# Patient Record
Sex: MALE | Race: WHITE | NOT HISPANIC OR LATINO | Employment: FULL TIME | URBAN - NONMETROPOLITAN AREA
[De-identification: names, ages, dates, MRNs, and addresses within clinical notes are randomized per-mention and may not be internally consistent; named-entity substitution may affect disease eponyms.]

---

## 2017-01-16 ENCOUNTER — TRANSCRIBE ORDERS (OUTPATIENT)
Dept: PODIATRY | Facility: CLINIC | Age: 42
End: 2017-01-16

## 2017-01-16 DIAGNOSIS — L60.0 INGROWN TOENAIL: Primary | ICD-10-CM

## 2017-01-17 ENCOUNTER — OFFICE VISIT (OUTPATIENT)
Dept: PODIATRY | Facility: CLINIC | Age: 42
End: 2017-01-17

## 2017-01-17 VITALS
SYSTOLIC BLOOD PRESSURE: 147 MMHG | DIASTOLIC BLOOD PRESSURE: 79 MMHG | HEART RATE: 96 BPM | BODY MASS INDEX: 36.45 KG/M2 | HEIGHT: 78 IN | WEIGHT: 315 LBS | OXYGEN SATURATION: 96 %

## 2017-01-17 DIAGNOSIS — L60.0 INGROWN TOENAIL: ICD-10-CM

## 2017-01-17 DIAGNOSIS — L03.031 CELLULITIS OF GREAT TOE, RIGHT: Primary | ICD-10-CM

## 2017-01-17 PROCEDURE — 11750 EXCISION NAIL&NAIL MATRIX: CPT | Performed by: PODIATRIST

## 2017-01-17 PROCEDURE — 99203 OFFICE O/P NEW LOW 30 MIN: CPT | Performed by: PODIATRIST

## 2017-01-17 RX ORDER — SULFAMETHOXAZOLE AND TRIMETHOPRIM 800; 160 MG/1; MG/1
TABLET ORAL
Refills: 0 | COMMUNITY
Start: 2017-01-11 | End: 2020-03-09

## 2017-01-17 RX ORDER — SPIRONOLACTONE 50 MG/1
TABLET, FILM COATED ORAL
Refills: 2 | COMMUNITY
Start: 2017-01-11

## 2017-01-17 RX ORDER — VALSARTAN AND HYDROCHLOROTHIAZIDE 320; 25 MG/1; MG/1
TABLET, FILM COATED ORAL
Refills: 2 | COMMUNITY
Start: 2017-01-11 | End: 2020-03-09

## 2017-01-17 RX ORDER — HYDROCODONE BITARTRATE AND ACETAMINOPHEN 10; 325 MG/1; MG/1
TABLET ORAL
Refills: 0 | COMMUNITY
Start: 2017-01-11

## 2017-01-17 RX ORDER — CARISOPRODOL 350 MG/1
TABLET ORAL
Refills: 0 | COMMUNITY
Start: 2017-01-11

## 2017-01-17 RX ORDER — AMLODIPINE BESYLATE 10 MG/1
TABLET ORAL
Refills: 3 | COMMUNITY
Start: 2017-01-11 | End: 2020-03-09

## 2017-01-17 NOTE — PROGRESS NOTES
"Wilner Ortiz  1975  41 y.o. male   Patient presents with right big toe pain.    01/17/2017  Chief Complaint   Patient presents with   • Right Foot - Pain           History of Present Illness    Patient presents to clinic today with chief complaint of right great toe infection.  He is currently taking antibiotics prescribed to him by his primary care doctor.  Him and his primary care doctor both tried to do ingrown toenail procedures on this toe.  He says it is currently very swollen and painful.  Rates the pain as 8 out of 10.  Describes it as sharp and constant.  He denies any trauma to the toe.  He has no other pedal complaints.         No past medical history on file.      No past surgical history on file.      No family history on file.      Social History     Social History   • Marital status:      Spouse name: N/A   • Number of children: N/A   • Years of education: N/A     Occupational History   • Not on file.     Social History Main Topics   • Smoking status: Not on file   • Smokeless tobacco: Not on file   • Alcohol use Not on file   • Drug use: Not on file   • Sexual activity: Not on file     Other Topics Concern   • Not on file     Social History Narrative         Current Outpatient Prescriptions   Medication Sig Dispense Refill   • amLODIPine (NORVASC) 10 MG tablet   3   • carisoprodol (SOMA) 350 MG tablet   0   • HYDROcodone-acetaminophen (NORCO)  MG per tablet   0   • spironolactone (ALDACTONE) 50 MG tablet   2   • sulfamethoxazole-trimethoprim (BACTRIM DS,SEPTRA DS) 800-160 MG per tablet   0   • valsartan-hydrochlorothiazide (DIOVAN-HCT) 320-25 MG per tablet   2     No current facility-administered medications for this visit.          OBJECTIVE    Visit Vitals   • /79   • Pulse 96   • Ht 78\" (198.1 cm)   • Wt (!) 428 lb (194 kg)   • SpO2 96%   • BMI 49.46 kg/m2         Review of Systems   Constitutional: Negative for chills and fever.   Cardiovascular: Negative for chest pain. "   Gastrointestinal: Negative for constipation, diarrhea, nausea and vomiting.   Skin: Negative for wound. cellulitis and edema right hallux  Musculoskeletal: right foot pain      Constitutional: well developed, well nourished    HEENT: Normocephalic and atraumatic, normal hearing    Respiratory: Non labored respirations noted    Cardiovascular:    DP/PT pulses palpable    CFT brisk  to all digits  Skin temp is warm to warm from proximal tibia to distal digits  Pedal hair growth present.   Erythema and edema noted to the right hallux     Musculoskeletal:  Muscle strength is 5/5 for all muscle groups tested   ROM of the 1st MTP is full without pain or crepitus  ROM of the MTJ is full without pain or crepitus    ROM of the STJ is full without pain or crepitus    ROM of the ankle joint is full without pain or crepitus    Significant pain on palpation to the right hallux lateral border and nail plate  Rectus foot type     Dermatological:   Nails 2-5 are within normal limits for length and thickness.  Right hallux nail is ingrowing and infected on the lateral border    Webspaces 1-4 bilateral are clean, dry and intact.   No subcutaneous nodules or masses noted        Neurological:   Protective sensation intact    Sensation intact to light touch    DTR intact    Psychiatric: A&O x 3 with normal mood and affect. NAD.         Nail Removal  Date/Time: 1/18/2017 9:15 AM  Performed by: SCOTT BRANDON  Authorized by: SCOTT BRANDON   Consent: Verbal consent obtained. Written consent obtained.  Risks and benefits: risks, benefits and alternatives were discussed  Consent given by: patient  Patient understanding: patient states understanding of the procedure being performed  Patient identity confirmed: verbally with patient  Nail removal extremity: right hallux.  Anesthesia: digital block    Anesthesia:  Anesthesia: digital block  Local Anesthetic: lidocaine 2% without epinephrine   Sedation:  Patient sedated: no    Preparation:  skin prepped with Betadine  Amount removed: complete  Nail matrix removed: complete  Dressing: antibiotic ointment and dressing applied  Patient tolerance: Patient tolerated the procedure well with no immediate complications              ASSESSMENT AND PLAN    Wilner was seen today for pain.    Diagnoses and all orders for this visit:    Cellulitis of great toe, right    Ingrown toenail    Phenol nail avulsion right hallux nail  Aftercare instruction sheet dispensed to patient  Finish course of antibiotics  All his questions were answered to his satisfaction.  Return to clinic in 2 weeks or sooner if needed            This document has been electronically signed by Dennis Cervantes DPM on January 18, 2017 9:13 AM     1/18/2017  9:13 AM

## 2017-01-17 NOTE — MR AVS SNAPSHOT
"                        Wilner Ortiz   1/17/2017 3:00 PM   Office Visit    Dept Phone:  [phone]   Encounter #:  [id number]    Provider:  Dennis Cervantes DPM   Department:  [facility] P[facility]                Your Full Care Plan              Your Updated Medication List          This list is accurate as of: 1/17/17  3:36 PM.  Always use your most recent med list.                amLODIPine 10 MG tablet   Commonly known as:  NORVASC       carisoprodol 350 MG tablet   Commonly known as:  SOMA       HYDROcodone-acetaminophen  MG per tablet   Commonly known as:  NORCO       spironolactone 50 MG tablet   Commonly known as:  ALDACTONE       sulfamethoxazole-trimethoprim 800-160 MG per tablet   Commonly known as:  BACTRIM DS,SEPTRA DS       valsartan-hydrochlorothiazide 320-25 MG per tablet   Commonly known as:  DIOVAN-HCT               Instructions     None    Patient Instructions History      Upcoming Appointments     Visit Type Date Time Department    NEW PATIENT 1/17/2017  3:00 PM [facility]    FOLLOW UP 1/31/2017  3:30 PM [facility]      MyChart Signup     Our records indicate that you have declined [facility] sign[facility]. If you would like to sign up for [facility], please email [email or web address] or call [phone] to obtain an activation code.             Other Info from Your Visit           Your Appointments     Jan 31, 2017  3:30 PM CST   Follow Up with Dennis Cervantes DPM   [facility] PODIAT[facility] (--)    [address]  [facility] [place] [address] [place] [id number] [phone]           Arrive 15 minutes prior to appointment.              Allergies     Lisinopril        Reason for Visit     Right Foot - Pain           Vital Signs     Blood Pressure Pulse Height Weight Oxygen Saturation Body Mass Index    147/79 96 78\" (198.1 cm) 428 lb (194 kg) 96% 49.46 kg/m2        "

## 2017-01-31 ENCOUNTER — OFFICE VISIT (OUTPATIENT)
Dept: PODIATRY | Facility: CLINIC | Age: 42
End: 2017-01-31

## 2017-01-31 VITALS — WEIGHT: 315 LBS | HEIGHT: 78 IN | BODY MASS INDEX: 36.45 KG/M2

## 2017-01-31 DIAGNOSIS — L60.0 INGROWN TOENAIL: Primary | ICD-10-CM

## 2017-01-31 PROCEDURE — 99212 OFFICE O/P EST SF 10 MIN: CPT | Performed by: PODIATRIST

## 2017-01-31 NOTE — PROGRESS NOTES
"Wilner Ortiz  1975  41 y.o. male   Patient presents for follow-up on right hallux toenail removal.    01/31/17    Chief Complaint   Patient presents with   • Right Foot - Follow-up           History of Present Illness    Patient presents to clinic today for follow-up of his right hallux nail avulsion.  Has been following the instructions given to him on his last visit.  He is doing well at this time.  Currently has no complaint.        No past medical history on file.      No past surgical history on file.      No family history on file.      Social History     Social History   • Marital status:      Spouse name: N/A   • Number of children: N/A   • Years of education: N/A     Occupational History   • Not on file.     Social History Main Topics   • Smoking status: Never Smoker   • Smokeless tobacco: Not on file   • Alcohol use No   • Drug use: Not on file   • Sexual activity: Not on file     Other Topics Concern   • Not on file     Social History Narrative   • No narrative on file         Current Outpatient Prescriptions   Medication Sig Dispense Refill   • amLODIPine (NORVASC) 10 MG tablet   3   • carisoprodol (SOMA) 350 MG tablet   0   • HYDROcodone-acetaminophen (NORCO)  MG per tablet   0   • spironolactone (ALDACTONE) 50 MG tablet   2   • sulfamethoxazole-trimethoprim (BACTRIM DS,SEPTRA DS) 800-160 MG per tablet   0   • valsartan-hydrochlorothiazide (DIOVAN-HCT) 320-25 MG per tablet   2     No current facility-administered medications for this visit.          OBJECTIVE    Visit Vitals   • Ht 78\" (198.1 cm)   • Wt (!) 428 lb (194 kg)   • BMI 49.46 kg/m2         Review of Systems   Constitutional: Negative for chills and fever.   Cardiovascular: Negative for chest pain.   Gastrointestinal: Negative for constipation, diarrhea, nausea and vomiting.   Skin: Negative for wound.         Constitutional: well developed, well nourished    HEENT: Normocephalic and atraumatic, normal " hearing    Respiratory: Non labored respirations noted    Cardiovascular:    DP/PT pulses palpable    Resolved edema and erythema to right hallux    Musculoskeletal:  Muscle strength is 5/5 for all muscle groups tested   No pain on palpation noted  Rectus foot type     Dermatological:   Nails 2-5 are within normal limits for length and thickness.  Right hallux nail is absent. No signs of infection  Webspaces 1-4 bilateral are clean, dry and intact.   No subcutaneous nodules or masses noted        Neurological:   Protective sensation intact      Psychiatric: A&O x 3 with normal mood and affect. NAD.         Procedures        ASSESSMENT AND PLAN    Wilner was seen today for follow-up.    Diagnoses and all orders for this visit:    Ingrown toenail    Patient is doing well at this time  Continue twice a day soaks and dressing with Neosporin and a Band-Aid until there is no drainage on the Band-Aid  All his questions were answered to his satisfaction  Return to clinic as needed.            This document has been electronically signed by Dennis Cervantes DPM on January 31, 2017 4:20 PM     1/31/2017  4:20 PM

## 2017-02-14 ENCOUNTER — OFFICE VISIT (OUTPATIENT)
Dept: PODIATRY | Facility: CLINIC | Age: 42
End: 2017-02-14

## 2017-02-14 VITALS — WEIGHT: 315 LBS | HEIGHT: 78 IN | BODY MASS INDEX: 36.45 KG/M2

## 2017-02-14 DIAGNOSIS — B07.0 PLANTAR WARTS: ICD-10-CM

## 2017-02-14 DIAGNOSIS — L60.0 INGROWN TOENAIL: Primary | ICD-10-CM

## 2017-02-14 PROCEDURE — 11750 EXCISION NAIL&NAIL MATRIX: CPT | Performed by: PODIATRIST

## 2017-02-14 PROCEDURE — 99213 OFFICE O/P EST LOW 20 MIN: CPT | Performed by: PODIATRIST

## 2017-02-14 NOTE — PROGRESS NOTES
"Wilneravery Ortiz  1975  41 y.o. male   Patient presents today with a complaint of his left great toenail having a recurrent ingrown nail.    2/14/17    Chief Complaint   Patient presents with   • Left Foot - Ingrown Toenail           History of Present Illness    Patient presents to clinic today with chief complaint of left ingrowing toenail.  States it is a constant problem for him.  He regularly gets ingrowing nails that are painful and become red and swollen.  Has recently had his right hallux nail removed permanently is very happy with that.  The same procedure done to his left hallux nail.  He also complains of a spot on the bottom of his left second toe and the end of his left third toe.  He states they have been there for a long time.  They are uncomfortable at times.  He has not tried anything to treat them.  He has no other pedal complaints.        No past medical history on file.      No past surgical history on file.      No family history on file.      Social History     Social History   • Marital status:      Spouse name: N/A   • Number of children: N/A   • Years of education: N/A     Occupational History   • Not on file.     Social History Main Topics   • Smoking status: Never Smoker   • Smokeless tobacco: Not on file   • Alcohol use No   • Drug use: Not on file   • Sexual activity: Not on file     Other Topics Concern   • Not on file     Social History Narrative         Current Outpatient Prescriptions   Medication Sig Dispense Refill   • amLODIPine (NORVASC) 10 MG tablet   3   • carisoprodol (SOMA) 350 MG tablet   0   • HYDROcodone-acetaminophen (NORCO)  MG per tablet   0   • spironolactone (ALDACTONE) 50 MG tablet   2   • sulfamethoxazole-trimethoprim (BACTRIM DS,SEPTRA DS) 800-160 MG per tablet   0   • valsartan-hydrochlorothiazide (DIOVAN-HCT) 320-25 MG per tablet   2     No current facility-administered medications for this visit.          OBJECTIVE    Visit Vitals   • Ht 78\" " (198.1 cm)   • Wt (!) 428 lb (194 kg)   • BMI 49.46 kg/m2         Review of Systems   Constitutional: Negative for chills and fever.   Cardiovascular: Negative for chest pain.   Gastrointestinal: Negative for constipation, diarrhea, nausea and vomiting.   Skin: Negative for wound. spot on left 2nd and 3rd toes. Ingrowing left hallux nail      Constitutional: well developed, well nourished    HEENT: Normocephalic and atraumatic, normal hearing    Respiratory: Non labored respirations noted    Cardiovascular:    DP/PT pulses palpable    CFT brisk  to all digits  Skin temp is warm to warm from proximal tibia to distal digits  Pedal hair growth present.   No erythema   Mild edema noted to left hallux medial and lateral nail borders    Musculoskeletal:  Muscle strength is 5/5 for all muscle groups tested   ROM of the 1st MTP is full without pain or crepitus  ROM of the MTJ is full without pain or crepitus    ROM of the STJ is full without pain or crepitus    ROM of the ankle joint is full without pain or crepitus    Pain on palpation to the left hallux nail  Rectus foot type     Dermatological:   Nails 1-5 are within normal limits for length and thickness.  Left hallux nail is ingrowing on the medial lateral borders.  No signs of infection noted.  Mild edema noted.     Webspaces 1-4 bilateral are clean, dry and intact.   No subcutaneous nodules or masses noted    No open wounds noted   Skin lesions noted to the plantar aspect of the left second digit and distal aspect of the left third digit.  Skin lines are interrupted.  There is pain with lateral compression.    Neurological:   Protective sensation intact    Sensation intact to light touch    DTR intact    Psychiatric: A&O x 3 with normal mood and affect. NAD.           Nail Removal  Date/Time: 2/15/2017 6:14 PM  Performed by: SCOTT BRANDON  Authorized by: SCOTT BRANDON   Consent: Verbal consent obtained. Written consent obtained.  Risks and benefits: risks,  benefits and alternatives were discussed  Consent given by: patient  Patient understanding: patient states understanding of the procedure being performed  Patient identity confirmed: verbally with patient  Nail removal extremity: left hallux nail.  Anesthesia: digital block    Anesthesia:  Anesthesia: digital block  Local Anesthetic: lidocaine 2% without epinephrine   Preparation: skin prepped with Betadine  Amount removed: complete  Nail matrix removed: complete  Dressing: antibiotic ointment and dressing applied  Patient tolerance: Patient tolerated the procedure well with no immediate complications          ASSESSMENT AND PLAN    Wilner was seen today for ingrown toenail.    Diagnoses and all orders for this visit:    Ingrown toenail    Plantar warts      - Comprehensive foot and ankle exam performed  - Diagnosis, prevention and treatment of ingrown toenails discussed with patient, including risks and potential benefits of nail avulsion both temporary and permanent versus simple debridement.  - Patient elected for a total permanent nail avulsion to left hallux nail  - Dispensed aftercare instruction sheet  - Diagnosis, prevention and treatment of plantar warts were discussed with the patient, including treatment with cantharidin versus surgical excision. Patient elected to wait on treatment of his plantar warts until after his nail procedure has healed  - All questions were answered and the patient is in agreement with the current treatment plan.  - RTC in 2 weeks            This document has been electronically signed by Dennis Cervantes DPM on February 15, 2017 6:08 PM     2/15/2017  6:08 PM

## 2017-03-14 ENCOUNTER — OFFICE VISIT (OUTPATIENT)
Dept: PODIATRY | Facility: CLINIC | Age: 42
End: 2017-03-14

## 2017-03-14 VITALS — HEIGHT: 78 IN | WEIGHT: 315 LBS | BODY MASS INDEX: 36.45 KG/M2

## 2017-03-14 DIAGNOSIS — B07.0 PLANTAR WARTS: ICD-10-CM

## 2017-03-14 DIAGNOSIS — L60.0 INGROWN TOENAIL: Primary | ICD-10-CM

## 2017-03-14 PROCEDURE — 99212 OFFICE O/P EST SF 10 MIN: CPT | Performed by: PODIATRIST

## 2017-03-14 PROCEDURE — 17110 DESTRUCTION B9 LES UP TO 14: CPT | Performed by: PODIATRIST

## 2017-03-14 NOTE — PROGRESS NOTES
"Wilneravery Ortiz  1975  41 y.o. male   Patient presents today for a recheck of his left great toenail avulsion. He is also having a compound treatment administered to his left 2nd toe today as well.    03/14/17      Chief Complaint   Patient presents with   • Left Foot - Follow-up, Plantar Warts           History of Present Illness    Patient presents to clinic today for follow-up of his left ingrown toenail procedure.  He is doing well at this time.  He has been following instructions with regards to soaking and dressing changes.  He has no pain today.  He would like to have the warts on the bottom of his left foot treated.  Has been using an over-the-counter wart treatment which has not worked.  Has no other pedal complaints        No past medical history on file.      No past surgical history on file.      No family history on file.      Social History     Social History   • Marital status:      Spouse name: N/A   • Number of children: N/A   • Years of education: N/A     Occupational History   • Not on file.     Social History Main Topics   • Smoking status: Never Smoker   • Smokeless tobacco: Not on file   • Alcohol use No   • Drug use: Not on file   • Sexual activity: Not on file     Other Topics Concern   • Not on file     Social History Narrative         Current Outpatient Prescriptions   Medication Sig Dispense Refill   • amLODIPine (NORVASC) 10 MG tablet   3   • carisoprodol (SOMA) 350 MG tablet   0   • HYDROcodone-acetaminophen (NORCO)  MG per tablet   0   • spironolactone (ALDACTONE) 50 MG tablet   2   • sulfamethoxazole-trimethoprim (BACTRIM DS,SEPTRA DS) 800-160 MG per tablet   0   • valsartan-hydrochlorothiazide (DIOVAN-HCT) 320-25 MG per tablet   2     No current facility-administered medications for this visit.          OBJECTIVE    Visit Vitals   • Ht 78\" (198.1 cm)   • Wt (!) 428 lb (194 kg)   • BMI 49.46 kg/m2         Review of Systems   Constitutional: Negative for chills and " fever.   Cardiovascular: Negative for chest pain.   Gastrointestinal: Negative for constipation, diarrhea, nausea and vomiting.   Skin: Negative for wound. spot on left 2nd and 3rd toes.      Constitutional: well developed, well nourished    HEENT: Normocephalic and atraumatic, normal hearing    Respiratory: Non labored respirations noted    Cardiovascular:    DP/PT pulses palpable    CFT brisk  to all digits  Skin temp is warm to warm from proximal tibia to distal digits  Pedal hair growth present.   No erythema or edema noted    Musculoskeletal:  Muscle strength is 5/5 for all muscle groups tested   ROM of the 1st MTP is full without pain or crepitus  ROM of the MTJ is full without pain or crepitus    ROM of the STJ is full without pain or crepitus    ROM of the ankle joint is full without pain or crepitus    No pain on palpation noted  Rectus foot type     Dermatological:   Nails 1-5 are within normal limits for length and thickness.  Left hallux nail is absent. No signs of infection  Webspaces 1-4 bilateral are clean, dry and intact.   No subcutaneous nodules or masses noted    No open wounds noted   Skin lesions noted to the plantar aspect of the left second digit and distal aspect of the left third digit.  Skin lines are interrupted.  There is pain with lateral compression.    Neurological:   Protective sensation intact    Sensation intact to light touch    DTR intact    Psychiatric: A&O x 3 with normal mood and affect. NAD.           Procedures    ASSESSMENT AND PLAN    Wilner was seen today for follow-up and plantar warts.    Diagnoses and all orders for this visit:    Ingrown toenail    Plantar warts      - Diagnosis, prevention and treatment of plantar warts were discussed with the patient, including treatment with cantharidin versus surgical excision. Patient elected for treatment with cantharidin today.  - Lesions to the left second and third toe were debrided down to pinpoint bleeding.  Cantharidin was  applied and allowed to dry.  Lesions were covered with a Band-Aid.  Aftercare instruction sheet was given to patient.  - All questions were answered and the patient is in agreement with the current treatment plan.  - RTC in 2 weeks            This document has been electronically signed by Dennis Cervantes DPM on March 14, 2017 4:10 PM     3/14/2017  4:10 PM

## 2017-03-28 ENCOUNTER — OFFICE VISIT (OUTPATIENT)
Dept: PODIATRY | Facility: CLINIC | Age: 42
End: 2017-03-28

## 2017-03-28 VITALS — WEIGHT: 315 LBS | BODY MASS INDEX: 36.45 KG/M2 | HEIGHT: 78 IN

## 2017-03-28 DIAGNOSIS — B07.0 PLANTAR WARTS: Primary | ICD-10-CM

## 2017-03-28 PROCEDURE — 17110 DESTRUCTION B9 LES UP TO 14: CPT | Performed by: PODIATRIST

## 2017-03-28 NOTE — PROGRESS NOTES
"Wilner Ortiz  1975  41 y.o. male     Patient presents today for a recheck of his left foot.    03/28/17      Chief Complaint   Patient presents with   • Left Foot - Follow-up           History of Present Illness    Patient presents to clinic today for follow-up of his left  Foot plantar warts.  He had cantharidin applied last visit.  He tolerated it well.  He has no new complaints.        No past medical history on file.      No past surgical history on file.      No family history on file.      Social History     Social History   • Marital status:      Spouse name: N/A   • Number of children: N/A   • Years of education: N/A     Occupational History   • Not on file.     Social History Main Topics   • Smoking status: Never Smoker   • Smokeless tobacco: Not on file   • Alcohol use No   • Drug use: Not on file   • Sexual activity: Not on file     Other Topics Concern   • Not on file     Social History Narrative         Current Outpatient Prescriptions   Medication Sig Dispense Refill   • amLODIPine (NORVASC) 10 MG tablet   3   • carisoprodol (SOMA) 350 MG tablet   0   • HYDROcodone-acetaminophen (NORCO)  MG per tablet   0   • spironolactone (ALDACTONE) 50 MG tablet   2   • sulfamethoxazole-trimethoprim (BACTRIM DS,SEPTRA DS) 800-160 MG per tablet   0   • valsartan-hydrochlorothiazide (DIOVAN-HCT) 320-25 MG per tablet   2     No current facility-administered medications for this visit.          OBJECTIVE    Ht 78\" (198.1 cm)  Wt (!) 428 lb (194 kg)  BMI 49.46 kg/m2      Review of Systems   Constitutional: Negative for chills and fever.   Cardiovascular: Negative for chest pain.   Gastrointestinal: Negative for constipation, diarrhea, nausea and vomiting.   Skin: Negative for wound. spot on left 2nd and 3rd toes.      Constitutional: well developed, well nourished    HEENT: Normocephalic and atraumatic, normal hearing    Respiratory: Non labored respirations noted    Cardiovascular:    DP/PT pulses " palpable    CFT brisk  to all digits  Skin temp is warm to warm from proximal tibia to distal digits  Pedal hair growth present.   No erythema or edema noted    Musculoskeletal:  Muscle strength is 5/5 for all muscle groups tested   ROM of the 1st MTP is full without pain or crepitus  ROM of the MTJ is full without pain or crepitus    ROM of the STJ is full without pain or crepitus    ROM of the ankle joint is full without pain or crepitus    No pain on palpation noted  Rectus foot type     Dermatological:   Nails 2-5 are within normal limits for length and thickness.  Left hallux nail is absent. No signs of infection  Webspaces 1-4 bilateral are clean, dry and intact.   No subcutaneous nodules or masses noted    No open wounds noted   Skin lesions noted to the plantar aspect of the left second digit and distal aspect of the left third digit.  Skin lines are interrupted.  There is pain with lateral compression.    Neurological:   Protective sensation intact    Sensation intact to light touch    DTR intact    Psychiatric: A&O x 3 with normal mood and affect. NAD.           Procedures    ASSESSMENT AND PLAN    Wilner was seen today for follow-up.    Diagnoses and all orders for this visit:    Plantar warts    - Plantar warts on left foot are smaller however still present.  - Lesions to the left second and third toe were debrided down to pinpoint bleeding.  Cantharidin was applied and allowed to dry.  Lesions were covered with a Band-Aid.  Aftercare instruction sheet was given to patient.  - All questions were answered and the patient is in agreement with the current treatment plan.  - RTC in 2 weeks            This document has been electronically signed by Dennis Cervantes DPM on March 28, 2017 5:27 PM     3/28/2017  5:27 PM

## 2017-04-11 ENCOUNTER — OFFICE VISIT (OUTPATIENT)
Dept: PODIATRY | Facility: CLINIC | Age: 42
End: 2017-04-11

## 2017-04-11 VITALS — HEIGHT: 78 IN | BODY MASS INDEX: 36.45 KG/M2 | WEIGHT: 315 LBS

## 2017-04-11 DIAGNOSIS — B07.0 PLANTAR WARTS: Primary | ICD-10-CM

## 2017-04-11 PROCEDURE — 99212 OFFICE O/P EST SF 10 MIN: CPT | Performed by: PODIATRIST

## 2017-04-11 NOTE — PROGRESS NOTES
"Wilner Ortiz  1975  41 y.o. male     Patient presents today for a recheck of his left foot.    04/11/17      Chief Complaint   Patient presents with   • Left Foot - Follow-up, Plantar Warts           History of Present Illness    Patient presents to clinic today for follow-up of his left  Foot plantar warts.  He had cantharidin applied last visit.  He states that his skin blistered pretty good and was fairly painful.  He has no new complaints.        No past medical history on file.      No past surgical history on file.      No family history on file.      Social History     Social History   • Marital status:      Spouse name: N/A   • Number of children: N/A   • Years of education: N/A     Occupational History   • Not on file.     Social History Main Topics   • Smoking status: Never Smoker   • Smokeless tobacco: Not on file   • Alcohol use No   • Drug use: Not on file   • Sexual activity: Not on file     Other Topics Concern   • Not on file     Social History Narrative         Current Outpatient Prescriptions   Medication Sig Dispense Refill   • amLODIPine (NORVASC) 10 MG tablet   3   • carisoprodol (SOMA) 350 MG tablet   0   • HYDROcodone-acetaminophen (NORCO)  MG per tablet   0   • spironolactone (ALDACTONE) 50 MG tablet   2   • sulfamethoxazole-trimethoprim (BACTRIM DS,SEPTRA DS) 800-160 MG per tablet   0   • valsartan-hydrochlorothiazide (DIOVAN-HCT) 320-25 MG per tablet   2     No current facility-administered medications for this visit.          OBJECTIVE    Ht 78\" (198.1 cm)  Wt (!) 428 lb (194 kg)  BMI 49.46 kg/m2      Review of Systems   Constitutional: Negative for chills and fever.   Cardiovascular: Negative for chest pain.   Gastrointestinal: Negative for constipation, diarrhea, nausea and vomiting.   Skin: Negative for wound. spot on left 2nd and 3rd toes.      Constitutional: well developed, well nourished    HEENT: Normocephalic and atraumatic, normal hearing    Respiratory: Non " labored respirations noted    Cardiovascular:    DP/PT pulses palpable    CFT brisk  to all digits  Skin temp is warm to warm from proximal tibia to distal digits  Pedal hair growth present.   No erythema or edema noted    Musculoskeletal:  Muscle strength is 5/5 for all muscle groups tested   ROM of the 1st MTP is full without pain or crepitus  ROM of the MTJ is full without pain or crepitus    ROM of the STJ is full without pain or crepitus    ROM of the ankle joint is full without pain or crepitus    No pain on palpation noted  Rectus foot type     Dermatological:   Nails 2-5 are within normal limits for length and thickness.  Left hallux nail is absent. No signs of infection  Webspaces 1-4 bilateral are clean, dry and intact.   No subcutaneous nodules or masses noted    No open wounds noted   Skin lesions noted to the plantar aspect of the left second digit and distal aspect of the left third digit have resolved.     Neurological:   Protective sensation intact    Sensation intact to light touch    DTR intact    Psychiatric: A&O x 3 with normal mood and affect. NAD.           Procedures    ASSESSMENT AND PLAN    Wilner was seen today for follow-up and plantar warts.    Diagnoses and all orders for this visit:    Plantar warts    - Plantar warts on left foot are resolved  - All questions were answered and the patient is in agreement with the current treatment plan.  - RTC prn            This document has been electronically signed by Dennis Cervantes DPM on April 11, 2017 3:56 PM     4/11/2017  3:56 PM